# Patient Record
Sex: FEMALE
[De-identification: names, ages, dates, MRNs, and addresses within clinical notes are randomized per-mention and may not be internally consistent; named-entity substitution may affect disease eponyms.]

---

## 2023-02-06 ENCOUNTER — APPOINTMENT (OUTPATIENT)
Dept: ORTHOPEDIC SURGERY | Facility: CLINIC | Age: 68
End: 2023-02-06
Payer: MEDICARE

## 2023-02-06 VITALS — BODY MASS INDEX: 24.11 KG/M2 | HEIGHT: 66 IN | WEIGHT: 150 LBS

## 2023-02-06 DIAGNOSIS — S52.132A DISPLACED FRACTURE OF NECK OF LEFT RADIUS, INITIAL ENCOUNTER FOR CLOSED FRACTURE: ICD-10-CM

## 2023-02-06 PROBLEM — Z00.00 ENCOUNTER FOR PREVENTIVE HEALTH EXAMINATION: Status: ACTIVE | Noted: 2023-02-06

## 2023-02-06 PROCEDURE — 99203 OFFICE O/P NEW LOW 30 MIN: CPT

## 2023-02-06 PROCEDURE — 73080 X-RAY EXAM OF ELBOW: CPT | Mod: LT

## 2023-02-06 NOTE — DISCUSSION/SUMMARY
[de-identified] : Explained to the patient that since her injury was over a week ago and that we do not necessarily have to immobilize her with a sling.  She may start working on gentle range of motion exercises of her elbow.  She may do this in Epsom salt and warm water.\par \par The patient was advised to rest/ice the area.  They may alternate with warm compresses as needed.  Instructed not to perform any strenuous activity that may worsen symptoms.\par \par Take OTC Tylenol or Motrin as needed for pain.  The patient will follow-up in 2 weeks for repeat x-rays and further evaluation.  All of the patient's questions/concerns were answered in detail.\par \par The patient was seen under the supervision of Dr. Salinas.

## 2023-02-06 NOTE — PHYSICAL EXAM
[Left] : left elbow [Pain with Flexion] : pain with flexion [Pain with Extension] : pain with extension [Pain with Pronation] : pain with pronation [Pain with Supination] : pain with supination [5___] : supination 5[unfilled]/5 [] : no tenderness over medial epicondyle [FreeTextEntry9] : Mild limited ROM secondary to pain

## 2023-02-06 NOTE — HISTORY OF PRESENT ILLNESS
[de-identified] : Patient is a 67-year-old female who reports to the office for evaluation of her left elbow pain since 1/27/2023.  She was at a party when she accidentally fell backwards after tripping on a dog that was lying down behind her.  She states that when she fell she landed on her left elbow.  She had x-rays taken at [facility] and confirmed that she has a radial neck fracture.  Certain range of motion and palpating certain areas of the elbow aggravate the patient's pain.  She is right-hand dominant.  Denies any numbness or tingling.

## 2023-02-06 NOTE — IMAGING
[de-identified] : X-rays taken of the patient's left elbow in the office today revealed a nondisplaced fracture of the radial neck.  Otherwise, no other significant abnormalities were seen.

## 2023-02-24 ENCOUNTER — APPOINTMENT (OUTPATIENT)
Dept: ORTHOPEDIC SURGERY | Facility: CLINIC | Age: 68
End: 2023-02-24
Payer: MEDICARE

## 2023-02-24 VITALS — BODY MASS INDEX: 24.11 KG/M2 | WEIGHT: 150 LBS | HEIGHT: 66 IN

## 2023-02-24 DIAGNOSIS — S52.135D NONDISPLACED FRACTURE OF NECK OF LEFT RADIUS, SUBSEQUENT ENCOUNTER FOR CLOSED FRACTURE WITH ROUTINE HEALING: ICD-10-CM

## 2023-02-24 PROCEDURE — 73080 X-RAY EXAM OF ELBOW: CPT | Mod: LT

## 2023-02-24 PROCEDURE — 99213 OFFICE O/P EST LOW 20 MIN: CPT

## 2023-02-24 NOTE — DATA REVIEWED
[FreeTextEntry1] : Radiographs 3 views of the left elbow reviewed showing a well aligned healing left radial neck fracture

## 2023-02-24 NOTE — PHYSICAL EXAM
[de-identified] : Patient has good range of motion to the left elbow has good supination pronation there is no erythema ecchymoses or abrasions there is good motion to the fingers.

## 2023-02-24 NOTE — ASSESSMENT
[FreeTextEntry1] : Patient is healing fracture of the left radial neck she will see me back on an as-needed basis I have advised her to limit some of the lifting activities that she would be doing if it hurts to lift she should not be doing that lifting.  She will continue with her range of motion exercises

## 2023-02-24 NOTE — HISTORY OF PRESENT ILLNESS
[de-identified] : 67-year-old female had a fall resulting injury to her left elbow.  She comes in today for evaluation and she has been moving her elbow her pain is diminishing.